# Patient Record
Sex: MALE
[De-identification: names, ages, dates, MRNs, and addresses within clinical notes are randomized per-mention and may not be internally consistent; named-entity substitution may affect disease eponyms.]

---

## 2022-06-14 PROBLEM — Z00.00 ENCOUNTER FOR PREVENTIVE HEALTH EXAMINATION: Status: ACTIVE | Noted: 2022-06-14

## 2022-06-15 ENCOUNTER — APPOINTMENT (OUTPATIENT)
Dept: OTOLARYNGOLOGY | Facility: CLINIC | Age: 72
End: 2022-06-15
Payer: MEDICARE

## 2022-06-15 VITALS — HEIGHT: 64 IN | WEIGHT: 160 LBS | BODY MASS INDEX: 27.31 KG/M2 | TEMPERATURE: 96.3 F

## 2022-06-15 PROCEDURE — 92567 TYMPANOMETRY: CPT

## 2022-06-15 PROCEDURE — 31231 NASAL ENDOSCOPY DX: CPT

## 2022-06-15 PROCEDURE — 99203 OFFICE O/P NEW LOW 30 MIN: CPT | Mod: 25

## 2022-06-15 PROCEDURE — G0268 REMOVAL OF IMPACTED WAX MD: CPT

## 2022-06-15 PROCEDURE — 92557 COMPREHENSIVE HEARING TEST: CPT

## 2022-06-20 NOTE — HISTORY OF PRESENT ILLNESS
[de-identified] : YANICK WAYNE is a 71 year old male, referred from Dr. Rodríguez, was seen with complaints of problems with his right ear.  On June 1, he noticed that his right ear felt clogged.  By the sixth he had right-sided otalgia that improved with Advil.  He was seen by city MD and told that he had a perforation.  On June 8, he was seen and told that he had wax that was cleaned.  However, he still felt clogged and on the ninth and 10th he had some pain but upon the 11th the pain was really quite significant.  He took ibuprofen 3 times and this seemed to help.  He started Cortisporin drops on June 12 and this has helped but he still feels full.  He denies any acute upper respiratory tract infection but does have allergies and longstanding sinus problems.  At one point surgery had been recommended.  The patient had no other ear nose or throat complaints at this visit.

## 2022-06-20 NOTE — CONSULT LETTER
[FreeTextEntry2] : LORETTA SMITH\par  [FreeTextEntry1] : \par \par Dear  Dr. LORETTA SMITH,\par \par I had the pleasure of seeing your patient today.  \par Please see my note below.\par \par \par Thank you very much for allowing me to participate in the care of your patient.\par \par Sincerely- hope this finds you well.\par \par \par Ryan Amrit\par \par Mikey Marcus MD\par [facility]\par [facility]\par  [facility]\par \par

## 2022-06-20 NOTE — PHYSICAL EXAM
[FreeTextEntry1] : General:\par The patient was alert and oriented and in no distress.\par Voice was clear.\par \par Face:\par The patient had no facial asymmetry or mass.\par The skin was unremarkable.\par \par Oral cavity:\par The oral mucosa was normal.\par The oral and base of tongue were clear and without mass.\par The gingival and buccal mucosa were moist and without lesions.\par The palate moved well.\par There was no cleft to the palate.\par There appeared to be good salivary flow.  \par There was no pus, erythema or mass in the oral cavity.\par \par Neck: \par The neck was symmetrical.\par The parotid and submandibular glands were normal without masses.\par The trachea was midline and there was no unusual crepitus.\par The thyroid was smooth and nontender and no masses were palpated.\par There was no significant cervical adenopathy.\par \par \par Neuro:\par Neurologically, the patient was awake, alert, and oriented to person, place and time. There were no obvious focal neurologic abnormalities.  Cranial nerves II through XII were grossly intact.\par \par TMJ:\par The temporomandibular joints were nontender.\par There was no abnormal crepitus\par He has a crossbite and is missing right maxillary dentition\par \par \par Ears:\par The left ear canal was clear, the left eardrum was intact and mobile\par An operating microscope was used to evaluate the right ear.  There was a combination of purulence and cerumen that was cleared with suction after culturing.  He has some granulations close to the tympanic membrane in the external canal.\par There was purulence on the eardrum and some area of sclerosis but without evidence of middle ear disease or perforation.\par  [de-identified] : A complete audiogram was ordered, done and reviewed with the patient.  This showed mild symmetric high-frequency sensorineural hearing losses with normal tympanograms   nasal endoscopy: \par CPT 07209\par Procedure Note:\par \par Endoscopy was done with Covid precautions and with video. All risks and benefits were discussed with the patient and consent obtained.\par \par Nasal endoscopy was done with topical anesthesia of Pontocaine and Afrin and a      nasal endoscope.\par Indication: Nasal congestion, rule out sinusitis.\par Procedure: The nasal cavity was anesthetized with topical Afrin and Pontocaine. An  endoscope was used and inserted into the nasal cavity.\par Attention was first paid to the anterior nasal cavity.\par Endocoscopy was performed to inspect the interior of the nasal cavity, the nasal septum,  the middle and superior meati, the inferior, middle and superior turbinates, and the spheno-ethmoidal  recesses, the nasopharynx and eustachian tube orifices bilaterally. \par All findings were normal except:\par He had a sharp right septal deflection coming back to the left with polyps extending just the lower limits of the middle turbinates bilaterally

## 2022-06-20 NOTE — ASSESSMENT
[FreeTextEntry1] : It was my impression that he had a right sided acute otitis externa that already was improving with Cortisporin.  However he had cerumen and debris and there was not certain this could not have a fungal component.  I took a culture and treated the canal with CSF powder and remove the remaining cerumen\par He has a septal deflection and nasal polyps.  It sounds like this is not bothersome for him.  We discussed the options of intervention including oral and nasal steroids and nasal and sinus surgery.  However, at this point as he is not having complaints or symptoms I did not recommend intervention.  Otherwise, he would be a candidate for septoplasty and endoscopic surgery.\par I wanted to see him back in follow-up in a week to make sure the infection has resolved.\par

## 2022-06-21 LAB — EAR NOSE AND THROAT CULTURE: ABNORMAL

## 2022-06-22 ENCOUNTER — APPOINTMENT (OUTPATIENT)
Dept: OTOLARYNGOLOGY | Facility: CLINIC | Age: 72
End: 2022-06-22
Payer: MEDICARE

## 2022-06-22 VITALS — BODY MASS INDEX: 27.31 KG/M2 | HEIGHT: 64 IN | WEIGHT: 160 LBS | TEMPERATURE: 97.5 F

## 2022-06-22 PROCEDURE — 99213 OFFICE O/P EST LOW 20 MIN: CPT

## 2022-06-22 NOTE — HISTORY OF PRESENT ILLNESS
[de-identified] : YANICK WAYNE was seen in follow up on June 22nd.  His culture was Candida and I had treated him with CSF powder.  He notes that he is mostly better but still has a little bit of fullness.  He had also improved with Cipro eardrops.  He comes in for repeat evaluation and treatment.

## 2022-06-22 NOTE — PHYSICAL EXAM
[FreeTextEntry1] : General:\par The patient was alert and oriented and in no distress.\par Voice was clear.\par \par Face:\par The patient had no facial asymmetry or mass.\par The skin was unremarkable.\par \par Ears:\par \par The left ear canal was clear the left eardrum was intact and mobile\par The right ear canal was markedly improved and there was perhaps a little bit of congestion congestion.  The tympanic membrane was normal though there was still was some powder on the tympanic membrane\par The canal was really treated with CSF without complication

## 2022-06-22 NOTE — ASSESSMENT
[FreeTextEntry1] : It was my impression that this probably was a mixed bacterial and fungal otitis externa.  At the last visit, the persistence was primarily from the Candida and this responded further with CSF powder.  I explained this to the patient, treated 1 more time and asked him to keep the ear dry for another 5 days and would like to see him back if the symptoms fail to resolve completely.

## 2022-06-22 NOTE — CONSULT LETTER
[FreeTextEntry2] : LORETTA SMITH\par  [FreeTextEntry1] : \par \par Dear  Dr. LORETTA SMITH,\par \par I had the pleasure of seeing your patient today.  \par Please see my note below.\par \par \par Thank you very much for allowing me to participate in the care of your patient.\par \par Sincerely- hope this finds you well.\par \par \par Ryan Amrit\par \par Mikey Marcus MD\par [facility]\par [facility]\par  [facility]\par \par

## 2022-06-22 NOTE — REASON FOR VISIT
[Subsequent Evaluation] : a subsequent evaluation for [FreeTextEntry2] : ENT CX 1 week follow up; right ear complaint

## 2022-07-06 ENCOUNTER — APPOINTMENT (OUTPATIENT)
Dept: OTOLARYNGOLOGY | Facility: CLINIC | Age: 72
End: 2022-07-06

## 2022-07-06 DIAGNOSIS — B36.9 SUPERFICIAL MYCOSIS, UNSPECIFIED: ICD-10-CM

## 2022-07-06 DIAGNOSIS — H62.42 SUPERFICIAL MYCOSIS, UNSPECIFIED: ICD-10-CM

## 2022-07-06 PROCEDURE — 99213 OFFICE O/P EST LOW 20 MIN: CPT | Mod: 25

## 2022-07-06 PROCEDURE — 92504 EAR MICROSCOPY EXAMINATION: CPT

## 2022-07-06 RX ORDER — FLUCONAZOLE 200 MG/1
200 TABLET ORAL DAILY
Qty: 7 | Refills: 0 | Status: ACTIVE | COMMUNITY
Start: 2022-07-06 | End: 1900-01-01

## 2022-07-08 NOTE — PHYSICAL EXAM
[FreeTextEntry1] : General:\par The patient was alert and oriented and in no distress.\par Voice was clear.   \par \par Face:\par The patient had no facial asymmetry or mass.\par The skin was unremarkable.\par \par Oral cavity:\par The oral mucosa was normal.\par The oral and base of tongue were clear and without mass.\par The gingival and buccal mucosa were moist and without lesions.\par The palate moved well.\par There was no cleft to the palate.\par There appeared to be good salivary flow.  \par There was no pus, erythema or mass in the oral cavity.\par \par Nose: \par The external nose had no significant deformity.  There was no facial tenderness.  On anterior rhinoscopy, the nasal mucosa was clear.  The anterior septum was midline.  There were no visualized polyps purulence  or masses.\par He had polyps lateral to the middle turbinate bilaterally without purulence\par \par \par Neck: \par The neck was symmetrical.\par The parotid and submandibular glands were normal without masses.\par The trachea was midline and there was no unusual crepitus.\par The thyroid was smooth and nontender and no masses were palpated.\par There was no significant cervical adenopathy.\par \par \par \par Ears:\par \par An operating microscope was used for evaluation.  On the right side, the ear canal now was clear the eardrum was intact and mobile and there still was some CSF powder on the tympanic membrane\par The left ear canal, which was clear last week was filled with a combination of apparent Candida albicans and Candida nigricans.  This was suctioned and the debris was teased off the tympanic membrane.  The ear canal was then treated with CSF powder a culture was taken\par

## 2022-07-08 NOTE — ASSESSMENT
[FreeTextEntry1] : It was my impression that he now had a left-sided acute otomycosis and the side had resolved.  Right I did not think that 1 side because the other and last week the left ear looked fine.  In any case, I debrided the canal and treated with CSF powder.  But because of the second infection I treated with Diflucan for a week as well.  I would like to reevaluate in a week and retreat if needed.  I recommend keeping the ears dry in the interim.\par

## 2022-07-08 NOTE — CONSULT LETTER
[FreeTextEntry2] : LORETTA SMITH\par  [FreeTextEntry1] : \par \par Dear  Dr. LORETTA SMITH,\par \par I had the pleasure of seeing your patient today.  \par Please see my note below.\par \par \par Thank you very much for allowing me to participate in the care of your patient.\par \par Sincerely- hope this finds you well.\par \par \par Ryan Amrit\par \par Mikey Marcus MD\par [facility]\par [facility]\par  [facility]\par \par

## 2022-07-08 NOTE — HISTORY OF PRESENT ILLNESS
[de-identified] : YANICK WAYNE was seen in follow-up on July 6.  He notes that his right ear was better but he has had worsening left-sided otalgia and drainage over the last couple of days.  He has a history of chronic sinusitis with nasal polyps but has not had any significant obstructive symptoms.  He comes in for repeat evaluation

## 2022-07-12 LAB — EAR NOSE AND THROAT CULTURE: ABNORMAL

## 2022-07-19 ENCOUNTER — APPOINTMENT (OUTPATIENT)
Dept: OTOLARYNGOLOGY | Facility: CLINIC | Age: 72
End: 2022-07-19

## 2022-07-19 VITALS — TEMPERATURE: 97.2 F | BODY MASS INDEX: 27.31 KG/M2 | WEIGHT: 160 LBS | HEIGHT: 64 IN

## 2022-07-19 PROCEDURE — 31231 NASAL ENDOSCOPY DX: CPT

## 2022-07-19 PROCEDURE — 99214 OFFICE O/P EST MOD 30 MIN: CPT | Mod: 25

## 2022-07-19 PROCEDURE — 92504 EAR MICROSCOPY EXAMINATION: CPT

## 2022-07-19 NOTE — CONSULT LETTER
[FreeTextEntry2] : LORETTA SMITH\par  [FreeTextEntry1] : \par \par Dear  Dr. LORETTA SMITH,\par \par I had the pleasure of seeing your patient today.  \par Please see my note below.\par \par \par Thank you very much for allowing me to participate in the care of your patient.\par \par Sincerely- hope this finds you well.\par \par \par Ryan Amrit\par \par Mikey Marcus MD\par [facility]\par [facility]\par  [facility]\par \par

## 2022-07-19 NOTE — HISTORY OF PRESENT ILLNESS
[de-identified] : YANICK WAYNE was seen in follow-up on July 19.  His culture of the left ear was Aspergillus Niger and he was feeling much better.  He still feels a little full in the left ear.  Additionally, he has the complaints of the diminished sense of smell.  At times he feels that his smell does return.  The patient had no other ear nose or throat complaints at this visit.

## 2022-07-19 NOTE — PHYSICAL EXAM
[FreeTextEntry1] : General:\par The patient was alert and oriented and in no distress.\par Voice was clear. \par \par Face:\par The patient had no facial asymmetry or mass.\par The skin was unremarkable.\par \par \par Oral cavity:\par The oral mucosa was normal.\par The oral and base of tongue were clear and without mass.\par The gingival and buccal mucosa were moist and without lesions.\par The palate moved well.\par There was no cleft to the palate.\par There appeared to be good salivary flow.  \par There was no pus, erythema or mass in the oral cavity.\par \par \par General:\par The patient was alert and oriented and in no distress.\par Voice was clear.\par \par Face:\par The patient had no facial asymmetry or mass.\par The skin was unremarkable.\par \par Eyes:\par The pupils were equal round and reactive to light and accommodation.\par There was no significant nystagmus or disconjugate gaze noted.\par \par Nose: \par The external nose had no significant deformity.  There was no facial tenderness.  On anterior rhinoscopy, the nasal mucosa was clear.  The anterior septum was midline.  There were no visualized polyps purulence  or masses.\par \par Oral cavity:\par The oral mucosa was normal.\par The oral and base of tongue were clear and without mass.\par The gingival and buccal mucosa were moist and without lesions.\par The palate moved well.\par There was no cleft to the palate.\par There appeared to be good salivary flow.  \par There was no pus, erythema or mass in the oral cavity.\par \par \par \par Neck: \par The neck was symmetrical.\par The parotid and submandibular glands were normal without masses.\par The trachea was midline and there was no unusual crepitus.\par The thyroid was smooth and nontender and no masses were palpated.\par There was no significant cervical adenopathy.\par \par Otoneuro:\par No significant nystagmus was noted.\par Finger nose finger and rapid alternating motions were normal.\par Romberg testing was normal.\par Martelle-Hallpike maneuver was negative.\par There was no bruit or thrill in the neck.\par \par TMJ:\par The temporomandibular joints were nontender.\par There was no abnormal crepitus and no significant malocclusion\par \par \par Ears:\par An operating microscope was used for evaluation.  On the right side the canal was cleared by dry minimal CSF powder persistent the eardrum was intact and mobile.\par On the left, there still was some debris combination of old Aspergillus Niger and powder and using a #5 and #3 suction the operating microscope this was debrided.  The eardrum was intact and mobile.  The canal was treated with CSF powder.\par \par Nasal endoscopy: \par CPT 62387\par Procedure Note:\par \par Endoscopy was done with Covid precautions and with video. All risks and benefits were discussed with the patient and consent obtained.\par \par Nasal endoscopy was done with topical anesthesia of Pontocaine and Afrin and a      nasal endoscope.\par Indication: Hyposmia\par Procedure: The nasal cavity was anesthetized with topical Afrin and Pontocaine. An  endoscope was used and inserted into the nasal cavity.\par Attention was first paid to the anterior nasal cavity.\par Endocoscopy was performed to inspect the interior of the nasal cavity, the nasal septum,  the middle and superior meati, the inferior, middle and superior turbinates, and the spheno-ethmoidal  recesses, the nasopharynx and eustachian tube orifices bilaterally. \par All findings were normal except:\par The mucosa is somewhat boggy with sharp septal deflection.  Polyps obstructing the right cribriform medial to the middle turbinate without nasal obstruction or purulence\par \par \par

## 2022-07-19 NOTE — ASSESSMENT
[FreeTextEntry1] : It was my impression that the right and then left fungal otitis externa, I treated the left ear 1 more time.  A culture was Aspergillus Niger.  Recommend keeping the ear dry and stopping the use of Q-tips like to see back in follow-up for this persistent long-term symptoms.\par \par He has the diminished sense of smell that at times returns and chronic nasal polyposis obstructing airflow.  I reviewed the pathogenesis with him.  I suggested a trial of Flonase, but explained that this might need to be a persistent medication.  Depending on how he does I would consider repeat course of oral steroids, but not right now with the recent fungal infection and possibly imaging to evaluate for further intervention.

## 2023-03-08 ENCOUNTER — APPOINTMENT (OUTPATIENT)
Dept: OTOLARYNGOLOGY | Facility: CLINIC | Age: 73
End: 2023-03-08
Payer: MEDICARE

## 2023-03-08 VITALS — WEIGHT: 160 LBS | BODY MASS INDEX: 27.31 KG/M2 | HEIGHT: 64 IN

## 2023-03-08 PROCEDURE — 99214 OFFICE O/P EST MOD 30 MIN: CPT | Mod: 25

## 2023-03-08 PROCEDURE — 31231 NASAL ENDOSCOPY DX: CPT

## 2023-05-02 ENCOUNTER — TRANSCRIPTION ENCOUNTER (OUTPATIENT)
Age: 73
End: 2023-05-02

## 2023-05-02 ENCOUNTER — APPOINTMENT (OUTPATIENT)
Dept: OTOLARYNGOLOGY | Facility: CLINIC | Age: 73
End: 2023-05-02
Payer: MEDICARE

## 2023-05-02 VITALS — BODY MASS INDEX: 27.31 KG/M2 | WEIGHT: 160 LBS | HEIGHT: 64 IN

## 2023-05-02 DIAGNOSIS — Z86.39 PERSONAL HISTORY OF OTHER ENDOCRINE, NUTRITIONAL AND METABOLIC DISEASE: ICD-10-CM

## 2023-05-02 DIAGNOSIS — Z87.09 PERSONAL HISTORY OF OTHER DISEASES OF THE RESPIRATORY SYSTEM: ICD-10-CM

## 2023-05-02 DIAGNOSIS — Z87.891 PERSONAL HISTORY OF NICOTINE DEPENDENCE: ICD-10-CM

## 2023-05-02 DIAGNOSIS — Z78.9 OTHER SPECIFIED HEALTH STATUS: ICD-10-CM

## 2023-05-02 PROCEDURE — 99213 OFFICE O/P EST LOW 20 MIN: CPT

## 2023-05-02 RX ORDER — SIMVASTATIN 80 MG/1
TABLET, FILM COATED ORAL
Refills: 0 | Status: ACTIVE | COMMUNITY

## 2023-05-02 NOTE — HISTORY OF PRESENT ILLNESS
[de-identified] : YANICK WAYNE is a 72 year man with a history of fungal EO AD. His right ear si infected. He had his ear cleaned in [place].

## 2023-05-08 ENCOUNTER — OUTPATIENT (OUTPATIENT)
Dept: OUTPATIENT SERVICES | Facility: HOSPITAL | Age: 73
LOS: 1 days | End: 2023-05-08

## 2023-05-08 ENCOUNTER — APPOINTMENT (OUTPATIENT)
Dept: CT IMAGING | Facility: CLINIC | Age: 73
End: 2023-05-08
Payer: MEDICARE

## 2023-05-08 PROCEDURE — 70486 CT MAXILLOFACIAL W/O DYE: CPT | Mod: 26,MH

## 2023-05-08 NOTE — PHYSICAL EXAM
[FreeTextEntry1] : General:\par The patient was alert and oriented and in no distress.\par Voice was clear.  He was obviously congested and had a hyponasal voice.\par \par Ears:\par The external ears were normal without deformity.\par The ear canals were clear.\par The tympanic membranes were intact and normal.  There was no evidence of recurrent otomycosis\par \par Oral cavity:\par The oral mucosa was normal.\par The oral and base of tongue were clear and without mass.\par The gingival and buccal mucosa were moist and without lesions.\par The palate moved well.\par There was no cleft to the palate.\par There appeared to be good salivary flow.  \par There was no pus, erythema or mass in the oral cavity.\par \par Neck: \par The neck was symmetrical.\par The parotid and submandibular glands were normal without masses.\par The trachea was midline and there was no unusual crepitus.\par The thyroid was smooth and nontender and no masses were palpated.\par There was no significant cervical adenopathy.\par \par Nasal endoscopy: \par CPT 13386\par Procedure Note:\par \par Endoscopy was done with Covid precautions and with video. All risks and benefits were discussed with the patient and consent obtained.\par \par Nasal endoscopy was done with topical anesthesia of Pontocaine and Afrin and a      nasal endoscope.\par Indication: Nasal congestion, rule out sinusitis.\par Procedure: The nasal cavity was anesthetized with topical Afrin and Pontocaine. An  endoscope was used and inserted into the nasal cavity.\par Attention was first paid to the anterior nasal cavity.\par Endocoscopy was performed to inspect the interior of the nasal cavity, the nasal septum,  the middle and superior meati, the inferior, middle and superior turbinates, and the spheno-ethmoidal  recesses, the nasopharynx and eustachian tube orifices bilaterally. including the nasal mocosa, the possibility of polyps and the consistency of the nasal mucous.\par All findings were normal except:\par \par He had polyps on the right side extending to the lower limits of the middle turbinate without purulence or thick mucin\par On the left the polyps extended to the floor without purulence

## 2023-05-08 NOTE — HISTORY OF PRESENT ILLNESS
[de-identified] : YANICK WAYNE was seen on March 8.  I had last seen him in July.  Since I had seen him he had an episode of otomycosis again that was treated in [place] with CSF powder successfully.  He comes in now, however, having had an acute exacerbation of his nasal congestion after exposure to dust.  He was treated with a course of prednisone and Astelin and fluticasone and Xhance and really is not much better.  He has significant nasal obstruction and the loss of his sense of smell.  Recently he has been a little bit tight in the chest.  He has a long history of nasal polyps and chronic sinusitis

## 2023-05-08 NOTE — ADDENDUM
[FreeTextEntry1] : 5/8/23  ct reviewed and report reviewed-  dns, pansinusitis, pansinus polyposis.   RV to review.

## 2023-05-08 NOTE — ASSESSMENT
[FreeTextEntry1] : It was my impression that he had a significant exacerbation of his pansinusitis with nasal polyps with only temporary minor improvement with steroids orally.  He is taking nasal steroids and azelastine but this is no longer helping.  I reviewed the findings with him.  I discussed the option of another course of prednisone which he declined.  At this point, I suggested imaging but will recommend image guided endoscopic sinus surgery.  I explained that there still is a risk of recurrence after any surgery.  I would follow with budesonide rinses and explained he may need to use this for the indefinite future after his procedure.  At this point I would not recommend Dupixent in a nonoperated nose but it is certainly an option should he develop recurrences.  I will talk to him after this imaging and discuss this further with him and asked him to see Dr. Rodríguez especially about his chest.

## 2023-05-10 ENCOUNTER — APPOINTMENT (OUTPATIENT)
Dept: OTOLARYNGOLOGY | Facility: CLINIC | Age: 73
End: 2023-05-10
Payer: MEDICARE

## 2023-05-10 VITALS — BODY MASS INDEX: 27.31 KG/M2 | WEIGHT: 160 LBS | HEIGHT: 64 IN

## 2023-05-10 DIAGNOSIS — B49 UNSPECIFIED MYCOSIS: ICD-10-CM

## 2023-05-10 PROCEDURE — 99215 OFFICE O/P EST HI 40 MIN: CPT | Mod: 25

## 2023-05-10 PROCEDURE — 31231 NASAL ENDOSCOPY DX: CPT

## 2023-05-10 PROCEDURE — 92504 EAR MICROSCOPY EXAMINATION: CPT

## 2023-05-10 RX ORDER — FLUCONAZOLE 150 MG/1
150 TABLET ORAL DAILY
Qty: 10 | Refills: 0 | Status: ACTIVE | COMMUNITY
Start: 2023-05-10 | End: 1900-01-01

## 2023-05-10 NOTE — PHYSICAL EXAM
[FreeTextEntry1] : General:\par The patient was alert and oriented and in no distress.\par Voice was clear.\par \par Face:\par The patient had no facial asymmetry or mass.\par The skin was unremarkable.\par \par \par Oral cavity:\par The oral mucosa was normal.\par The oral and base of tongue were clear and without mass.\par The gingival and buccal mucosa were moist and without lesions.\par The palate moved well.\par There was no cleft to the palate.\par There appeared to be good salivary flow.  \par There was no pus, erythema or mass in the oral cavity.\par \par \par Ears:\par The left ear canal was clear the left eardrum was intact and mobile.\par An operating microscope was used on the right for debridement.  He had significant amounts of white Aspergillus.  This was suctioned and beyond that was significant amounts of thick purulent discharge.  This was cleaned in entirety and then again treated with CSF powder.\par \par \par Nasal endoscopy: \par CPT 09891\par Procedure Note:\par \par Endoscopy was done with Covid precautions and with video. All risks and benefits were discussed with the patient and consent obtained.\par \par Nasal endoscopy was done with topical anesthesia of Pontocaine and Afrin and a      nasal endoscope.\par Indication: Nasal congestion, rule out sinusitis.\par Procedure: The nasal cavity was anesthetized with topical Afrin and Pontocaine. An  endoscope was used and inserted into the nasal cavity.\par Attention was first paid to the anterior nasal cavity.\par Endocoscopy was performed to inspect the interior of the nasal cavity, the nasal septum,  the middle and superior meati, the inferior, middle and superior turbinates, and the spheno-ethmoidal  recesses, the nasopharynx and eustachian tube orifices bilaterally. including the nasal mocosa, the possibility of polyps and the consistency of the nasal mucous.\par All findings were normal except:\par \par This was done with a CT for guidance.\par He has polyps extending approximately three quarters the way down the nasal cavity on the left and filling the middle meatus.  He has polyps extending to about the lower limit of the middle turbinate on the right with a septal deflection and inferior turbinate hypertrophy\par \par His imaging shows the pansinusitis with pansinus polyposis septal deflection and inferior turbinate hypertrophy

## 2023-05-10 NOTE — CONSULT LETTER
[FreeTextEntry2] : LORETTA SMITH\par  [FreeTextEntry1] : \par \par Dear  Dr. LORETTA SMITH,\par \par I had the pleasure of seeing your patient today.  \par Please see my note below.\par \par \par Thank you very much for allowing me to participate in the care of your patient.\par \par Sincerely- hope this finds you well.\par \par \par Ryan Amrit\par \par Mikey Marcus MD\par [facility]\par [facility]\par  [facility]\par \par

## 2023-05-10 NOTE — ASSESSMENT
[FreeTextEntry1] : I reviewed the findings at length with the patient and then with his daughter as well.  He has a Candida albicans otitis externa and this was cleaned and treated with CSF powder and as it is persisting I placed him on Diflucan and recommend keeping the ear dry.  I will see him back for repeat cleaning in a week and explained that these can take quite a while to get better\par He has a pansinusitis and pansinus polyposis and it does not seem to be bothering him that much.  I did not see evidence of complication.  He is not using nasal steroids as he does not like the medication dripping down his throat.\par My suggestion would be for endoscopic sinus surgery and following with budesonide rinses.  At this point, I do not believe he can get approved for Dupixent as it is generally used for those with recurrences but that would be an option as well.  I reviewed this again with his daughter and they will decide.\par I explained to his daughter that this is not likely aspirin sensitivity as he takes a baby aspirin\par

## 2023-05-10 NOTE — HISTORY OF PRESENT ILLNESS
[de-identified] : YANICK WAYNE was seen on May 10.  He had seen Dr. Whaley 8 days ago and had a right-sided fungal otitis externa treated with CSF powder after cleaning.  In a couple of days he noticed this got worse again.  He comes in for repeat evaluation.  In the interim, he had sinus imaging and comes in to review these as well.  The patient had no other ear nose or throat complaints at this visit.

## 2023-05-17 ENCOUNTER — APPOINTMENT (OUTPATIENT)
Dept: OTOLARYNGOLOGY | Facility: CLINIC | Age: 73
End: 2023-05-17
Payer: MEDICARE

## 2023-05-17 VITALS — HEIGHT: 64 IN | BODY MASS INDEX: 27.31 KG/M2 | WEIGHT: 160 LBS

## 2023-05-17 PROCEDURE — 31231 NASAL ENDOSCOPY DX: CPT

## 2023-05-17 PROCEDURE — 99214 OFFICE O/P EST MOD 30 MIN: CPT | Mod: 25

## 2023-05-17 PROCEDURE — 92504 EAR MICROSCOPY EXAMINATION: CPT

## 2023-05-17 RX ORDER — BUDESONIDE 180 UG/1
180 AEROSOL, POWDER RESPIRATORY (INHALATION)
Qty: 1 | Refills: 0 | Status: DISCONTINUED | COMMUNITY
Start: 2023-05-17 | End: 2023-05-17

## 2023-05-17 NOTE — ASSESSMENT
[FreeTextEntry1] : It was my impression that he had a relatively recalcitrant of fungal otitis externa on the right that has responded quite well now to Diflucan.  He has another day and I recommended finishing that, continuing to keep the ear dry and I treated 1 more time with CSF powder.  I would like to see him back in follow-up in 2 weeks and will clean out any residual crusting at that point.  I did not want to do it in the face of the recent resolving infection\par \par He has the massive pansinusitis and pansinus polyposis.  He has been reluctant to take nasal steroids.  I discussed this with him at length and discussed what his daughter said and he is willing to try nasal steroids and I gave him a prescription for Xhance which she said was covered.  Hopefully this will help but due to the extent of the polypoid disease I find it unlikely to be good enough.  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  I recommended DermOtic or mometasone oil drops once a week starting now in the left ear and then in the right ear once the fungal infection has responded completely\par \par More than 30  minutes was spent on the patient's care today including review of their chart, and the  history, visit, physical exam,  evaluation of possible diagnoses,  discussion with the patient, ordering evaluations and prescriptions and charting.

## 2023-05-17 NOTE — HISTORY OF PRESENT ILLNESS
[de-identified] : YANICK WAYNE was seen in follow-up on May 17.  His ear was much better and is not having further drainage but he still feels a little bit of fullness in the right ear.  He has the history of nasal polyposis and pansinusitis as well and wanted to discuss this further.  The patient had no other ear nose or throat complaints at this visit.

## 2023-05-17 NOTE — CONSULT LETTER
[FreeTextEntry2] : LORETTA SMITH\par  [FreeTextEntry1] : \par \par Dear  Dr. LORETTA SMITH,\par \par I had the pleasure of seeing your patient today.  \par Please see my note below.\par \par \par Thank you very much for allowing me to participate in the care of your patient.\par \par Sincerely- hope this finds you well.\par \par \par Ryan Amirt\par \par Mikey Marcus MD\par [facility]\par [facility]\par  [facility]\par \par

## 2023-05-17 NOTE — REASON FOR VISIT
[Subsequent Evaluation] : a subsequent evaluation for [FreeTextEntry2] : Sinus and right ear checjk up

## 2023-05-17 NOTE — PHYSICAL EXAM
[FreeTextEntry1] : \par The patient was alert and oriented and in no distress.\par Voice was clear.\par \par Face:\par The patient had no facial asymmetry or mass.\par The skin was unremarkable.\par \par Eyes:\par The pupils were equal round and reactive to light and accommodation.\par There was no significant nystagmus or disconjugate gaze noted.\par \par Nose: \par The external nose had no significant deformity.  There was no facial tenderness.  On anterior rhinoscopy, the nasal mucosa was clear.  The anterior septum was midline.  There were no visualized polyps purulence  or masses.\par \par Oral cavity:\par The oral mucosa was normal.\par The oral and base of tongue were clear and without mass.\par The gingival and buccal mucosa were moist and without lesions.\par The palate moved well.\par There was no cleft to the palate.\par There appeared to be good salivary flow.  \par There was no pus, erythema or mass in the oral cavity.\par \par \par Ears:\par The external ears were normal without deformity.\par The left ear canal was clear the left eardrum was intact and mobile.  The right ear canal was markedly improved.  There was dry debris along the tympanic membrane and walls of the ear canal that could not be readily removed at this time.\par Evaluation was done with the operating microscope for the suction and then CSF powder was placed again into the ear canal and along the tympanic membrane\par \par Neck: \par The neck was symmetrical.\par The parotid and submandibular glands were normal without masses.\par The trachea was midline and there was no unusual crepitus.\par The thyroid was smooth and nontender and no masses were palpated.\par There was no significant cervical adenopathy.\par \par \par Neuro:\par Neurologically, the patient was awake, alert, and oriented to person, place and time. There were no obvious focal neurologic abnormalities.  Cranial nerves II through XII were grossly intact.\par \par \par TMJ:\par The temporomandibular joints were nontender.\par There was no abnormal crepitus and no significant malocclusion\par \par \par Nasal endoscopy: \par CPT 71892\par Procedure Note:\par \par Endoscopy was done with Covid precautions and with video. All risks and benefits were discussed with the patient and consent obtained.\par \par Nasal endoscopy was done with topical anesthesia of Pontocaine and Afrin and a      nasal endoscope.\par Indication: Pansinusitis with nasal polyps\par Procedure: The nasal cavity was anesthetized with topical Afrin and Pontocaine. An  endoscope was used and inserted into the nasal cavity.\par Attention was first paid to the anterior nasal cavity.\par Endocoscopy was performed to inspect the interior of the nasal cavity, the nasal septum,  the middle and superior meati, the inferior, middle and superior turbinates, and the spheno-ethmoidal  recesses, the nasopharynx and eustachian tube orifices bilaterally. including the nasal mocosa, the possibility of polyps and the consistency of the nasal mucous.\par All findings were normal except:\par This showed polyps extending approximately three quarters of the way down the nasal cavity on the right and 7/8 of the way down on the left, well below the inferior margin of the middle turbinate on the right.\par

## 2023-05-22 RX ORDER — FLUTICASONE PROPIONATE 93 UG/1
93 SPRAY, METERED NASAL
Qty: 3 | Refills: 3 | Status: ACTIVE | COMMUNITY
Start: 2023-05-17

## 2023-05-31 ENCOUNTER — APPOINTMENT (OUTPATIENT)
Dept: OTOLARYNGOLOGY | Facility: CLINIC | Age: 73
End: 2023-05-31
Payer: MEDICARE

## 2023-05-31 DIAGNOSIS — H60.311 DIFFUSE OTITIS EXTERNA, RIGHT EAR: ICD-10-CM

## 2023-05-31 DIAGNOSIS — H90.3 SENSORINEURAL HEARING LOSS, BILATERAL: ICD-10-CM

## 2023-05-31 PROCEDURE — 92504 EAR MICROSCOPY EXAMINATION: CPT

## 2023-05-31 PROCEDURE — 99214 OFFICE O/P EST MOD 30 MIN: CPT

## 2023-05-31 NOTE — CONSULT LETTER
[FreeTextEntry2] : LORETTA SMITH\par  [FreeTextEntry1] : \par \par Dear  Dr. LORETTA SMITH,\par \par I had the pleasure of seeing your patient today.  \par Please see my note below.\par \par \par Thank you very much for allowing me to participate in the care of your patient.\par \par Sincerely- hope this finds you well.\par \par \par Ryan Amrit\par \par Mikey Marcus MD\par [facility]\par [facility]\par  [facility]\par \par  [DrEduardo  ___] : Dr. MCKEON

## 2023-05-31 NOTE — ASSESSMENT
[FreeTextEntry1] : It was my impression that he has the pansinusitis and pansinus polyposis.  He wanted to try Xhance and I felt that was fine as long as he is not having further sequela.  He may be a candidate for Dupixent although the general current protocol would be to have surgery first to see if he needs the Dupixent.\par Certainly he probably would be helped by the Dupixent.\par He has eczematoid otitis externa and I recommended topical moisturizing and the fungal otitis externa appears clear.\par At this point I like to see him back in follow-up to see what is the extent of his sinus disease in 2 to 3 months or earlier if needed\par \par More than 30  minutes was spent on the patient's care today including review of their chart, and the  history, visit, physical exam,  evaluation of possible diagnoses,  discussion with the patient, ordering evaluations and prescriptions and charting. \par I also reviewed the above with his daughter

## 2023-05-31 NOTE — HISTORY OF PRESENT ILLNESS
[de-identified] : YANICK WAYNE was seen in follow-up on May 31.  He notes that his ear is feeling better.  He comes in for debridement.  He is imaging showed the pansinus polyposis and pansinusitis.  He is now taking Xhance and having an allergy evaluation.  The patient had no other ear nose or throat complaints at this visit.

## 2023-05-31 NOTE — PHYSICAL EXAM
[FreeTextEntry1] : General:\par The patient was alert and oriented and in no distress.\par Voice was clear.\par \par Face:\par The patient had no facial asymmetry or mass.\par The skin was unremarkable.\par \par \par Ears:\par The left ear canal was clear the left eardrum was intact and mobile.\par The right ear canal is much much better.  An operating microscope was used to debride the old drops crusting and powder from the canal and beyond that the mucosa of the tympanic membrane looked good and the canals look clear\par \par I did not repeat his nasal endoscopy today

## 2023-09-13 ENCOUNTER — APPOINTMENT (OUTPATIENT)
Dept: ORTHOPEDIC SURGERY | Facility: CLINIC | Age: 73
End: 2023-09-13
Payer: MEDICARE

## 2023-09-13 VITALS — HEIGHT: 63 IN | BODY MASS INDEX: 28.35 KG/M2 | WEIGHT: 160 LBS

## 2023-09-13 PROCEDURE — 99203 OFFICE O/P NEW LOW 30 MIN: CPT

## 2023-09-13 PROCEDURE — 73060 X-RAY EXAM OF HUMERUS: CPT

## 2023-09-14 ENCOUNTER — OUTPATIENT (OUTPATIENT)
Dept: OUTPATIENT SERVICES | Facility: HOSPITAL | Age: 73
LOS: 1 days | End: 2023-09-14

## 2023-09-14 ENCOUNTER — APPOINTMENT (OUTPATIENT)
Dept: CT IMAGING | Facility: CLINIC | Age: 73
End: 2023-09-14
Payer: MEDICARE

## 2023-09-14 PROCEDURE — 73200 CT UPPER EXTREMITY W/O DYE: CPT | Mod: 26,RT,MH

## 2023-09-14 PROCEDURE — 76376 3D RENDER W/INTRP POSTPROCES: CPT | Mod: 26

## 2023-09-15 ENCOUNTER — APPOINTMENT (OUTPATIENT)
Dept: ORTHOPEDIC SURGERY | Facility: CLINIC | Age: 73
End: 2023-09-15
Payer: MEDICARE

## 2023-09-15 PROCEDURE — 99214 OFFICE O/P EST MOD 30 MIN: CPT

## 2023-09-21 ENCOUNTER — APPOINTMENT (OUTPATIENT)
Dept: ORTHOPEDIC SURGERY | Facility: CLINIC | Age: 73
End: 2023-09-21

## 2023-09-26 ENCOUNTER — APPOINTMENT (OUTPATIENT)
Dept: ORTHOPEDIC SURGERY | Facility: CLINIC | Age: 73
End: 2023-09-26
Payer: MEDICARE

## 2023-09-26 VITALS — HEIGHT: 63 IN | BODY MASS INDEX: 28.35 KG/M2 | WEIGHT: 160 LBS

## 2023-09-26 PROCEDURE — 73030 X-RAY EXAM OF SHOULDER: CPT | Mod: RT

## 2023-09-26 PROCEDURE — 99212 OFFICE O/P EST SF 10 MIN: CPT

## 2023-10-13 ENCOUNTER — APPOINTMENT (OUTPATIENT)
Dept: OTOLARYNGOLOGY | Facility: CLINIC | Age: 73
End: 2023-10-13
Payer: MEDICARE

## 2023-10-13 DIAGNOSIS — J34.2 DEVIATED NASAL SEPTUM: ICD-10-CM

## 2023-10-13 PROCEDURE — 99214 OFFICE O/P EST MOD 30 MIN: CPT | Mod: 25

## 2023-10-13 PROCEDURE — 31231 NASAL ENDOSCOPY DX: CPT

## 2023-10-13 PROCEDURE — 69210 REMOVE IMPACTED EAR WAX UNI: CPT

## 2023-10-17 ENCOUNTER — APPOINTMENT (OUTPATIENT)
Dept: ORTHOPEDIC SURGERY | Facility: CLINIC | Age: 73
End: 2023-10-17
Payer: MEDICARE

## 2023-10-17 VITALS — HEIGHT: 63 IN | BODY MASS INDEX: 28.35 KG/M2 | WEIGHT: 160 LBS

## 2023-10-17 PROCEDURE — 99212 OFFICE O/P EST SF 10 MIN: CPT

## 2023-10-17 PROCEDURE — 73030 X-RAY EXAM OF SHOULDER: CPT | Mod: RT

## 2024-03-01 ENCOUNTER — APPOINTMENT (OUTPATIENT)
Dept: ORTHOPEDIC SURGERY | Facility: CLINIC | Age: 74
End: 2024-03-01
Payer: MEDICARE

## 2024-03-01 DIAGNOSIS — S42.201A UNSPECIFIED FRACTURE OF UPPER END OF RIGHT HUMERUS, INITIAL ENCOUNTER FOR CLOSED FRACTURE: ICD-10-CM

## 2024-03-01 PROCEDURE — 99212 OFFICE O/P EST SF 10 MIN: CPT

## 2024-03-01 PROCEDURE — 73030 X-RAY EXAM OF SHOULDER: CPT | Mod: RT

## 2024-05-15 ENCOUNTER — APPOINTMENT (OUTPATIENT)
Dept: OTOLARYNGOLOGY | Facility: CLINIC | Age: 74
End: 2024-05-15
Payer: MEDICARE

## 2024-05-15 DIAGNOSIS — H62.40 SUPERFICIAL MYCOSIS, UNSPECIFIED: ICD-10-CM

## 2024-05-15 DIAGNOSIS — H60.8X3 OTHER OTITIS EXTERNA, BILATERAL: ICD-10-CM

## 2024-05-15 DIAGNOSIS — H61.20 IMPACTED CERUMEN, UNSPECIFIED EAR: ICD-10-CM

## 2024-05-15 DIAGNOSIS — J33.9 CHRONIC SINUSITIS, UNSPECIFIED: ICD-10-CM

## 2024-05-15 DIAGNOSIS — B36.9 SUPERFICIAL MYCOSIS, UNSPECIFIED: ICD-10-CM

## 2024-05-15 DIAGNOSIS — J32.9 CHRONIC SINUSITIS, UNSPECIFIED: ICD-10-CM

## 2024-05-15 PROCEDURE — 69210 REMOVE IMPACTED EAR WAX UNI: CPT

## 2024-05-15 PROCEDURE — 31231 NASAL ENDOSCOPY DX: CPT

## 2024-05-15 PROCEDURE — 99214 OFFICE O/P EST MOD 30 MIN: CPT | Mod: 25

## 2024-05-15 NOTE — HISTORY OF PRESENT ILLNESS
[de-identified] : YANICK WAYNE was seen in follow-up on May 15.  He was doing quite well and had decreased his Dupixent to once a month.  He still feels he has a good sense of smell but has noticed increased congestion and postnasal drip over the last couple of weeks.  His allergist added cetirizine.  Additionally, he had a fungal infection again in the right ear and feels that is not perfect.  He does use vinegar drops which helped.  He comes in for repeat evaluation

## 2024-05-15 NOTE — ASSESSMENT
[FreeTextEntry1] : It was my impression that he had a fungal otitis externa that was not completely resolved and a cerumen impaction that was cleared.  I treated the canal with CSF powder which has helped in the past  He has had some recurrence of his polypoid changes with cutting back on the Dupixent to once a month.  He did not want to increase this at this point.  He had done well also with Xhance in the past which she stopped.  I recommend going back to the Xhance or using fluticasone twice a day if he could not get this. If he is not doing better he should increase the frequency of the Dupixent.    This was discussed

## 2024-05-15 NOTE — PHYSICAL EXAM
[FreeTextEntry1] : General: The patient was alert and oriented and in no distress. Voice was clear.  Face: The patient had no facial asymmetry or mass. The skin was unremarkable.  Ears: The left ear canal was clear the left eardrum was intact and mobile.  The right ear canal had significant amounts of cerumen and either old or new fungal debris.  The cerumen was suctioned to clear and also with a forward biting forceps and the ear canal was treated with CSF powder   Oral cavity: The oral mucosa was normal. The oral and base of tongue were clear and without mass. The gingival and buccal mucosa were moist and without lesions. The palate moved well. There was no cleft to the palate. There appeared to be good salivary flow.   There was no pus, erythema or mass in the oral cavity.   Neck:  The neck was symmetrical. The parotid and submandibular glands were normal without masses. The trachea was midline and there was no unusual crepitus. The thyroid was smooth and nontender and no masses were palpated. There was no significant cervical adenopathy.  Nasal endoscopy:  CPT 71672 Procedure Note:  Endoscopy was done with Covid precautions and with video. All risks and benefits were discussed with the patient and consent obtained.  Nasal endoscopy was done with topical anesthesia of Pontocaine and Afrin and a      nasal endoscope. Indication: Nasal congestion, rule out sinusitis. Procedure: The nasal cavity was anesthetized with topical Afrin and Pontocaine. An  endoscope was used and inserted into the nasal cavity. Attention was first paid to the anterior nasal cavity. Endocoscopy was performed to inspect the interior of the nasal cavity, the nasal septum,  the middle and superior meati, the inferior, middle and superior turbinates, and the spheno-ethmoidal  recesses, the nasopharynx and eustachian tube orifices bilaterally. including the nasal mocosa, the possibility of polyps and the consistency of the nasal mucous. All findings were normal except: On the left, there was a tiny polypoid change of the left middle turbinate but otherwise the sinuses were patent.  There was polypoid recurrence on the right with obstruction to the right ethmoid labyrinth

## 2024-05-15 NOTE — CONSULT LETTER
[FreeTextEntry2] : LORETTA SMITH [FreeTextEntry1] :   Dear  Dr. LORETTA SMITH,  I had the pleasure of seeing your patient today.   Please see my note below.   Thank you very much for allowing me to participate in the care of your patient.  Sincerely,  [facility]  [facility]

## 2024-05-20 RX ORDER — FLUCONAZOLE 200 MG/1
200 TABLET ORAL DAILY
Qty: 7 | Refills: 0 | Status: ACTIVE | COMMUNITY
Start: 2024-05-20 | End: 1900-01-01

## 2024-09-10 ENCOUNTER — APPOINTMENT (OUTPATIENT)
Dept: ORTHOPEDIC SURGERY | Facility: CLINIC | Age: 74
End: 2024-09-10
Payer: MEDICARE

## 2024-09-10 VITALS — BODY MASS INDEX: 28.88 KG/M2 | WEIGHT: 163 LBS | HEIGHT: 63 IN

## 2024-09-10 DIAGNOSIS — S42.201A UNSPECIFIED FRACTURE OF UPPER END OF RIGHT HUMERUS, INITIAL ENCOUNTER FOR CLOSED FRACTURE: ICD-10-CM

## 2024-09-10 PROCEDURE — 73030 X-RAY EXAM OF SHOULDER: CPT | Mod: RT

## 2024-09-10 PROCEDURE — 99212 OFFICE O/P EST SF 10 MIN: CPT

## 2024-12-06 ENCOUNTER — OUTPATIENT (OUTPATIENT)
Dept: OUTPATIENT SERVICES | Facility: HOSPITAL | Age: 74
LOS: 1 days | End: 2024-12-06
Payer: MEDICARE

## 2024-12-06 ENCOUNTER — RESULT REVIEW (OUTPATIENT)
Age: 74
End: 2024-12-06

## 2024-12-06 ENCOUNTER — APPOINTMENT (OUTPATIENT)
Dept: ORTHOPEDIC SURGERY | Facility: CLINIC | Age: 74
End: 2024-12-06
Payer: MEDICARE

## 2024-12-06 VITALS
WEIGHT: 163 LBS | DIASTOLIC BLOOD PRESSURE: 81 MMHG | HEIGHT: 63 IN | SYSTOLIC BLOOD PRESSURE: 126 MMHG | HEART RATE: 51 BPM | OXYGEN SATURATION: 99 % | BODY MASS INDEX: 28.88 KG/M2

## 2024-12-06 DIAGNOSIS — M25.561 PAIN IN RIGHT KNEE: ICD-10-CM

## 2024-12-06 PROCEDURE — 73564 X-RAY EXAM KNEE 4 OR MORE: CPT

## 2024-12-06 PROCEDURE — 99214 OFFICE O/P EST MOD 30 MIN: CPT

## 2024-12-06 PROCEDURE — 73564 X-RAY EXAM KNEE 4 OR MORE: CPT | Mod: 26,RT

## 2024-12-11 ENCOUNTER — APPOINTMENT (OUTPATIENT)
Dept: OTOLARYNGOLOGY | Facility: CLINIC | Age: 74
End: 2024-12-11
Payer: MEDICARE

## 2024-12-11 ENCOUNTER — NON-APPOINTMENT (OUTPATIENT)
Age: 74
End: 2024-12-11

## 2024-12-11 DIAGNOSIS — J33.9 CHRONIC SINUSITIS, UNSPECIFIED: ICD-10-CM

## 2024-12-11 DIAGNOSIS — J32.9 CHRONIC SINUSITIS, UNSPECIFIED: ICD-10-CM

## 2024-12-11 DIAGNOSIS — H60.8X3 OTHER OTITIS EXTERNA, BILATERAL: ICD-10-CM

## 2024-12-11 DIAGNOSIS — B36.9 SUPERFICIAL MYCOSIS, UNSPECIFIED: ICD-10-CM

## 2024-12-11 DIAGNOSIS — H62.40 SUPERFICIAL MYCOSIS, UNSPECIFIED: ICD-10-CM

## 2024-12-11 PROCEDURE — 31231 NASAL ENDOSCOPY DX: CPT

## 2024-12-11 PROCEDURE — 99214 OFFICE O/P EST MOD 30 MIN: CPT | Mod: 25

## 2024-12-11 PROCEDURE — 92504 EAR MICROSCOPY EXAMINATION: CPT

## 2024-12-11 RX ORDER — HYDROCORTISONE AND ACETIC ACID OTIC 20.75; 10.375 MG/ML; MG/ML
1-2 SOLUTION AURICULAR (OTIC)
Qty: 1 | Refills: 3 | Status: ACTIVE | COMMUNITY
Start: 2024-12-11 | End: 1900-01-01

## 2024-12-11 RX ORDER — FLUTICASONE PROPIONATE 50 UG/1
50 SPRAY, METERED NASAL
Qty: 48 | Refills: 3 | Status: ACTIVE | COMMUNITY
Start: 2024-12-11 | End: 1900-01-01